# Patient Record
Sex: MALE | Race: WHITE | NOT HISPANIC OR LATINO | Employment: FULL TIME | URBAN - METROPOLITAN AREA
[De-identification: names, ages, dates, MRNs, and addresses within clinical notes are randomized per-mention and may not be internally consistent; named-entity substitution may affect disease eponyms.]

---

## 2023-05-22 LAB
APPEARANCE, URINE: CLEAR
BACTERIA, URINE: ABNORMAL /HPF
BILIRUBIN, URINE: NEGATIVE
BLOOD, URINE: ABNORMAL
COLOR, URINE: ABNORMAL
GLUCOSE, URINE: NEGATIVE MG/DL
KETONES, URINE: NEGATIVE MG/DL
LEUKOCYTE ESTERASE, URINE: NEGATIVE
NITRITE, URINE: NEGATIVE
PH, URINE: 6 (ref 5–8)
PROSTATE SPECIFIC AG (NG/ML) IN SER/PLAS: 1.28 NG/ML (ref 0–4)
PROTEIN, URINE: NEGATIVE MG/DL
RBC, URINE: 6 /HPF (ref 0–5)
SPECIFIC GRAVITY, URINE: 1 (ref 1–1.03)
SQUAMOUS EPITHELIAL CELLS, URINE: <1 /HPF
UROBILINOGEN, URINE: <2 MG/DL (ref 0–1.9)
WBC, URINE: ABNORMAL /HPF (ref 0–5)

## 2023-05-23 LAB — URINE CULTURE: ABNORMAL

## 2023-05-30 LAB
APPEARANCE, URINE: CLEAR
BACTERIA, URINE: ABNORMAL /HPF
BILIRUBIN, URINE: NEGATIVE
BLOOD, URINE: ABNORMAL
COLOR, URINE: ABNORMAL
GLUCOSE, URINE: NEGATIVE MG/DL
KETONES, URINE: NEGATIVE MG/DL
LEUKOCYTE ESTERASE, URINE: NEGATIVE
NITRITE, URINE: NEGATIVE
PH, URINE: 7 (ref 5–8)
PROTEIN, URINE: NEGATIVE MG/DL
RBC, URINE: 1 /HPF (ref 0–5)
SPECIFIC GRAVITY, URINE: 1 (ref 1–1.03)
SQUAMOUS EPITHELIAL CELLS, URINE: 1 /HPF
UROBILINOGEN, URINE: <2 MG/DL (ref 0–1.9)
WBC, URINE: <1 /HPF (ref 0–5)

## 2023-05-31 LAB — URINE CULTURE: ABNORMAL

## 2023-06-26 LAB
ALANINE AMINOTRANSFERASE (SGPT) (U/L) IN SER/PLAS: 19 U/L (ref 10–52)
ALBUMIN (G/DL) IN SER/PLAS: 4.1 G/DL (ref 3.4–5)
ALKALINE PHOSPHATASE (U/L) IN SER/PLAS: 55 U/L (ref 33–136)
ANION GAP IN SER/PLAS: 12 MMOL/L (ref 10–20)
ASPARTATE AMINOTRANSFERASE (SGOT) (U/L) IN SER/PLAS: 17 U/L (ref 9–39)
BASOPHILS (10*3/UL) IN BLOOD BY AUTOMATED COUNT: 0.07 X10E9/L (ref 0–0.1)
BASOPHILS/100 LEUKOCYTES IN BLOOD BY AUTOMATED COUNT: 0.8 % (ref 0–2)
BILIRUBIN TOTAL (MG/DL) IN SER/PLAS: 0.8 MG/DL (ref 0–1.2)
CALCIUM (MG/DL) IN SER/PLAS: 9.4 MG/DL (ref 8.6–10.6)
CARBON DIOXIDE, TOTAL (MMOL/L) IN SER/PLAS: 29 MMOL/L (ref 21–32)
CHLORIDE (MMOL/L) IN SER/PLAS: 100 MMOL/L (ref 98–107)
CHOLESTEROL (MG/DL) IN SER/PLAS: 137 MG/DL (ref 0–199)
CHOLESTEROL IN HDL (MG/DL) IN SER/PLAS: 63.3 MG/DL
CHOLESTEROL/HDL RATIO: 2.2
CREATININE (MG/DL) IN SER/PLAS: 0.94 MG/DL (ref 0.5–1.3)
EOSINOPHILS (10*3/UL) IN BLOOD BY AUTOMATED COUNT: 0.17 X10E9/L (ref 0–0.7)
EOSINOPHILS/100 LEUKOCYTES IN BLOOD BY AUTOMATED COUNT: 1.8 % (ref 0–6)
ERYTHROCYTE DISTRIBUTION WIDTH (RATIO) BY AUTOMATED COUNT: 13.7 % (ref 11.5–14.5)
ERYTHROCYTE MEAN CORPUSCULAR HEMOGLOBIN CONCENTRATION (G/DL) BY AUTOMATED: 33.4 G/DL (ref 32–36)
ERYTHROCYTE MEAN CORPUSCULAR VOLUME (FL) BY AUTOMATED COUNT: 100 FL (ref 80–100)
ERYTHROCYTES (10*6/UL) IN BLOOD BY AUTOMATED COUNT: 4.66 X10E12/L (ref 4.5–5.9)
ESTIMATED AVERAGE GLUCOSE FOR HBA1C: 111 MG/DL
GFR MALE: 88 ML/MIN/1.73M2
GLUCOSE (MG/DL) IN SER/PLAS: 95 MG/DL (ref 74–99)
HEMATOCRIT (%) IN BLOOD BY AUTOMATED COUNT: 46.4 % (ref 41–52)
HEMOGLOBIN (G/DL) IN BLOOD: 15.5 G/DL (ref 13.5–17.5)
HEMOGLOBIN A1C/HEMOGLOBIN TOTAL IN BLOOD: 5.5 %
IMMATURE GRANULOCYTES/100 LEUKOCYTES IN BLOOD BY AUTOMATED COUNT: 3.4 % (ref 0–0.9)
LDL: 58 MG/DL (ref 0–99)
LEUKOCYTES (10*3/UL) IN BLOOD BY AUTOMATED COUNT: 9.3 X10E9/L (ref 4.4–11.3)
LYMPHOCYTES (10*3/UL) IN BLOOD BY AUTOMATED COUNT: 1.76 X10E9/L (ref 1.2–4.8)
LYMPHOCYTES/100 LEUKOCYTES IN BLOOD BY AUTOMATED COUNT: 18.9 % (ref 13–44)
MONOCYTES (10*3/UL) IN BLOOD BY AUTOMATED COUNT: 1.09 X10E9/L (ref 0.1–1)
MONOCYTES/100 LEUKOCYTES IN BLOOD BY AUTOMATED COUNT: 11.7 % (ref 2–10)
NEUTROPHILS (10*3/UL) IN BLOOD BY AUTOMATED COUNT: 5.89 X10E9/L (ref 1.2–7.7)
NEUTROPHILS/100 LEUKOCYTES IN BLOOD BY AUTOMATED COUNT: 63.4 % (ref 40–80)
NRBC (PER 100 WBCS) BY AUTOMATED COUNT: 0 /100 WBC (ref 0–0)
PLATELETS (10*3/UL) IN BLOOD AUTOMATED COUNT: 270 X10E9/L (ref 150–450)
POTASSIUM (MMOL/L) IN SER/PLAS: 3.8 MMOL/L (ref 3.5–5.3)
PROTEIN TOTAL: 6.8 G/DL (ref 6.4–8.2)
SODIUM (MMOL/L) IN SER/PLAS: 137 MMOL/L (ref 136–145)
TRIGLYCERIDE (MG/DL) IN SER/PLAS: 79 MG/DL (ref 0–149)
URATE (MG/DL) IN SER/PLAS: 7.3 MG/DL (ref 4–7.5)
UREA NITROGEN (MG/DL) IN SER/PLAS: 21 MG/DL (ref 6–23)
VLDL: 16 MG/DL (ref 0–40)

## 2023-08-22 ENCOUNTER — PATIENT OUTREACH (OUTPATIENT)
Dept: CARE COORDINATION | Facility: CLINIC | Age: 68
End: 2023-08-22
Payer: MEDICARE

## 2023-08-22 NOTE — PROGRESS NOTES
Patient was outreached by floor nurse for ANURADHA. Patient presented with CVA symptoms after having MRI for hearing complaint. Patient will follow up with PCP tomorrow and will get order for  as output.  Patient has Medicare.     Task set for follow up after PCP     JANIS.

## 2023-09-01 LAB — TESTOSTERONE (NG/DL) IN SER/PLAS: 330 NG/DL (ref 240–1000)

## 2023-09-13 PROBLEM — N28.1 CYST, KIDNEY, ACQUIRED: Status: ACTIVE | Noted: 2023-09-13

## 2023-09-13 PROBLEM — M43.10 SPONDYLOLISTHESIS, GRADE 2: Status: ACTIVE | Noted: 2023-09-13

## 2023-09-13 PROBLEM — J45.909: Status: ACTIVE | Noted: 2023-09-13

## 2023-09-13 PROBLEM — J30.9 ALLERGIC RHINITIS: Status: ACTIVE | Noted: 2023-09-13

## 2023-09-13 PROBLEM — L29.9 PRURITIC DISORDER: Status: ACTIVE | Noted: 2023-09-13

## 2023-09-13 PROBLEM — L57.0 ACTINIC KERATOSES: Status: ACTIVE | Noted: 2023-09-13

## 2023-09-13 PROBLEM — M19.019 PRIMARY LOCALIZED OSTEOARTHROSIS OF SHOULDER REGION: Status: ACTIVE | Noted: 2023-09-13

## 2023-09-13 PROBLEM — M25.562 LEFT KNEE PAIN: Status: ACTIVE | Noted: 2023-09-13

## 2023-09-13 PROBLEM — R05.3 PERSISTENT COUGH: Status: ACTIVE | Noted: 2023-09-13

## 2023-09-13 PROBLEM — N52.9 MALE ERECTILE DISORDER OF ORGANIC ORIGIN: Status: ACTIVE | Noted: 2023-09-13

## 2023-09-13 PROBLEM — G47.33 OBSTRUCTIVE SLEEP APNEA: Status: ACTIVE | Noted: 2023-09-13

## 2023-09-13 PROBLEM — R94.31 ABNORMAL EKG: Status: ACTIVE | Noted: 2023-09-13

## 2023-09-13 PROBLEM — R73.01 IMPAIRED FASTING GLUCOSE: Status: ACTIVE | Noted: 2023-09-13

## 2023-09-13 PROBLEM — S82.034A CLOSED NONDISPLACED TRANSVERSE FRACTURE OF RIGHT PATELLA: Status: ACTIVE | Noted: 2023-09-13

## 2023-09-13 PROBLEM — D72.9 WHITE BLOOD CELL ABNORMALITY: Status: ACTIVE | Noted: 2023-09-13

## 2023-09-13 PROBLEM — M54.30 SCIATICA: Status: ACTIVE | Noted: 2023-09-13

## 2023-09-13 PROBLEM — N40.1 BENIGN PROSTATIC HYPERPLASIA WITH LOWER URINARY TRACT SYMPTOMS: Status: ACTIVE | Noted: 2023-09-13

## 2023-09-13 PROBLEM — M11.20 PSEUDOGOUT: Status: ACTIVE | Noted: 2023-09-13

## 2023-09-13 PROBLEM — K63.5 COLON POLYP: Status: ACTIVE | Noted: 2023-09-13

## 2023-09-13 PROBLEM — N18.30 CHRONIC KIDNEY DISEASE, STAGE III (MODERATE) (MULTI): Status: ACTIVE | Noted: 2023-09-13

## 2023-09-13 PROBLEM — M17.11 PRIMARY OSTEOARTHRITIS OF RIGHT KNEE: Status: ACTIVE | Noted: 2023-09-13

## 2023-09-13 PROBLEM — K76.0 FATTY (CHANGE OF) LIVER, NOT ELSEWHERE CLASSIFIED: Status: ACTIVE | Noted: 2023-09-13

## 2023-09-13 PROBLEM — K57.30 DIVERTICULOSIS OF LARGE INTESTINE WITHOUT HEMORRHAGE: Status: ACTIVE | Noted: 2023-09-13

## 2023-09-13 PROBLEM — E78.5 HYPERLIPIDEMIA: Status: ACTIVE | Noted: 2023-09-13

## 2023-09-13 PROBLEM — E88.810 DYSMETABOLIC SYNDROME X: Status: ACTIVE | Noted: 2023-09-13

## 2023-09-13 PROBLEM — I10 HYPERTENSION: Status: ACTIVE | Noted: 2023-09-13

## 2023-09-13 PROBLEM — M75.102 TEAR OF LEFT ROTATOR CUFF: Status: ACTIVE | Noted: 2023-09-13

## 2023-09-13 PROBLEM — N40.0 ENLARGED PROSTATE WITHOUT LOWER URINARY TRACT SYMPTOMS (LUTS): Status: ACTIVE | Noted: 2023-09-13

## 2023-09-13 PROBLEM — E66.9 OBESITY: Status: ACTIVE | Noted: 2023-09-13

## 2023-09-13 PROBLEM — M54.16 BILATERAL LUMBAR RADICULOPATHY: Status: ACTIVE | Noted: 2023-09-13

## 2023-09-13 PROBLEM — K59.01 SLOW TRANSIT CONSTIPATION: Status: ACTIVE | Noted: 2023-09-13

## 2023-09-13 PROBLEM — R31.21 ASYMPTOMATIC MICROSCOPIC HEMATURIA: Status: ACTIVE | Noted: 2023-09-13

## 2023-09-13 PROBLEM — E29.1 HYPOGONADISM MALE: Status: ACTIVE | Noted: 2023-09-13

## 2023-09-13 PROBLEM — G56.01 RIGHT CARPAL TUNNEL SYNDROME: Status: ACTIVE | Noted: 2023-09-13

## 2023-09-13 PROBLEM — E55.9 VITAMIN D DEFICIENCY: Status: ACTIVE | Noted: 2023-09-13

## 2023-09-13 PROBLEM — M17.12 PRIMARY OSTEOARTHRITIS OF LEFT KNEE: Status: ACTIVE | Noted: 2023-09-13

## 2023-09-13 PROBLEM — T14.8XXA SPRAIN: Status: ACTIVE | Noted: 2023-09-13

## 2023-09-13 PROBLEM — M54.50 LOW BACK PAIN: Status: ACTIVE | Noted: 2023-09-13

## 2023-09-13 PROBLEM — M19.012 OSTEOARTHRITIS OF LEFT SHOULDER: Status: ACTIVE | Noted: 2023-09-13

## 2023-09-13 PROBLEM — M10.9 GOUT, ARTHRITIS: Status: ACTIVE | Noted: 2023-09-13

## 2023-09-13 RX ORDER — METFORMIN HYDROCHLORIDE 500 MG/1
2 TABLET, EXTENDED RELEASE ORAL EVERY MORNING
COMMUNITY
Start: 2018-04-13 | End: 2024-02-09 | Stop reason: SDUPTHER

## 2023-09-13 RX ORDER — AMLODIPINE BESYLATE 5 MG/1
5 TABLET ORAL DAILY
COMMUNITY
End: 2023-11-20 | Stop reason: SDUPTHER

## 2023-09-13 RX ORDER — ALBUTEROL SULFATE 90 UG/1
1-2 AEROSOL, METERED RESPIRATORY (INHALATION)
COMMUNITY
Start: 2016-11-03

## 2023-09-13 RX ORDER — CYCLOBENZAPRINE HCL 10 MG
10 TABLET ORAL EVERY 12 HOURS PRN
COMMUNITY
Start: 2023-07-05

## 2023-09-13 RX ORDER — ONDANSETRON 4 MG/1
1 TABLET, FILM COATED ORAL 3 TIMES DAILY PRN
COMMUNITY
Start: 2019-11-18

## 2023-09-13 RX ORDER — TESTOSTERONE CYPIONATE 200 MG/ML
200 INJECTION, SOLUTION INTRAMUSCULAR
COMMUNITY
End: 2023-11-14

## 2023-09-13 RX ORDER — FLUTICASONE FUROATE 27.5 UG/1
1 SPRAY, METERED NASAL
COMMUNITY

## 2023-09-13 RX ORDER — OLMESARTAN MEDOXOMIL 20 MG/1
1 TABLET ORAL DAILY
COMMUNITY
Start: 2020-06-12 | End: 2023-11-18

## 2023-09-13 RX ORDER — FLUTICASONE PROPIONATE AND SALMETEROL 500; 50 UG/1; UG/1
1 POWDER RESPIRATORY (INHALATION) 2 TIMES DAILY
COMMUNITY

## 2023-09-13 RX ORDER — SILDENAFIL 100 MG/1
100 TABLET, FILM COATED ORAL DAILY
COMMUNITY
Start: 2020-07-29

## 2023-09-13 RX ORDER — SILDENAFIL CITRATE 20 MG/1
20 TABLET ORAL DAILY
COMMUNITY

## 2023-09-13 RX ORDER — POLYETHYLENE GLYCOL 3350 17 G/17G
17 POWDER, FOR SOLUTION ORAL DAILY PRN
COMMUNITY

## 2023-09-13 RX ORDER — TIZANIDINE 4 MG/1
4 TABLET ORAL AS NEEDED
COMMUNITY

## 2023-09-13 RX ORDER — OLMESARTAN MEDOXOMIL AND HYDROCHLOROTHIAZIDE 20/12.5 20; 12.5 MG/1; MG/1
1 TABLET ORAL DAILY
COMMUNITY
End: 2024-02-09 | Stop reason: SDUPTHER

## 2023-09-13 RX ORDER — TRIAMCINOLONE ACETONIDE 1 MG/G
CREAM TOPICAL 2 TIMES DAILY
COMMUNITY
Start: 2020-07-27

## 2023-09-13 RX ORDER — OMEPRAZOLE 20 MG/1
1 TABLET, DELAYED RELEASE ORAL DAILY
COMMUNITY
Start: 2019-11-18

## 2023-09-13 RX ORDER — RIVAROXABAN 10 MG/1
10 TABLET, FILM COATED ORAL DAILY
COMMUNITY
Start: 2023-07-03 | End: 2024-06-03 | Stop reason: WASHOUT

## 2023-09-13 RX ORDER — LEVOCETIRIZINE DIHYDROCHLORIDE 5 MG/1
5 TABLET, FILM COATED ORAL EVERY OTHER DAY
COMMUNITY

## 2023-09-13 RX ORDER — CLOPIDOGREL BISULFATE 75 MG/1
TABLET ORAL
COMMUNITY
Start: 2023-08-21 | End: 2024-06-03 | Stop reason: WASHOUT

## 2023-09-13 RX ORDER — FLUTICASONE PROPIONATE AND SALMETEROL 100; 50 UG/1; UG/1
1 POWDER RESPIRATORY (INHALATION)
COMMUNITY

## 2023-09-13 RX ORDER — COLCHICINE 0.6 MG/1
1.2 TABLET ORAL
COMMUNITY

## 2023-09-13 RX ORDER — ATORVASTATIN CALCIUM 10 MG/1
1 TABLET, FILM COATED ORAL DAILY
COMMUNITY
Start: 2012-05-18 | End: 2024-06-03 | Stop reason: WASHOUT

## 2023-09-13 RX ORDER — ASPIRIN 81 MG/1
81 TABLET ORAL DAILY
COMMUNITY

## 2023-09-13 RX ORDER — GABAPENTIN 300 MG/1
300 CAPSULE ORAL AS NEEDED
COMMUNITY
Start: 2023-07-03

## 2023-09-13 RX ORDER — DOCUSATE SODIUM 100 MG/1
100 CAPSULE, LIQUID FILLED ORAL 2 TIMES DAILY
COMMUNITY
Start: 2019-11-18

## 2023-09-15 ENCOUNTER — PATIENT OUTREACH (OUTPATIENT)
Dept: CARE COORDINATION | Facility: CLINIC | Age: 68
End: 2023-09-15
Payer: MEDICARE

## 2023-10-26 ENCOUNTER — CLINICAL SUPPORT (OUTPATIENT)
Dept: AUDIOLOGY | Facility: CLINIC | Age: 68
End: 2023-10-26
Payer: MEDICARE

## 2023-10-26 DIAGNOSIS — H90.A22 SENSORINEURAL HEARING LOSS (SNHL) OF LEFT EAR WITH RESTRICTED HEARING OF RIGHT EAR: Primary | ICD-10-CM

## 2023-10-30 NOTE — PROGRESS NOTES
HEARING AID CHECK    Name:  Michael Tam  :  1955  Age:  68 y.o.    HEARING AID INFORMATION:    Left Hearing Aid  Oticon Real 1 miniRITE-R  SN: [id number]  Warranty: 2026    Smart   SN: [id number]   Length  Left: 3(85)  Dome/Earmold  Left: 8mm power  Wax Filter  ProWax miniFit  Battery Size  Rechargeable  TLC Expiration:  2027    HISTORY:    Patient was seen for recheck of the above device.  He reported he continues to do okay with his hearing aid.  He is still getting used to it, but it does help give him sound awareness on his left side.  He still has difficulty hearing his wife if she is talking on that side of him.  Patient will be leaving for [place] next week, which he stated he does not want to bring the hearing aid so he does not lose it.    EXAM/PROCEDURES:    Informed patient that the office CROS demo is back from being on loan if he would like to demo it. He declined at this time but said he might consider it at a future appointment.    Visual inspection of the device revealed some cerumen accumulation.  Patient denied having changed the wax guard or dome, but does wipe it off periodically with an alcohol pad.  Replaced the domes and wax guard.  Reviewed and practiced with patient how to change these in the event he needs to do so at home prior to his next follow-up.    Connected hearing aid to fitting software.  Confirmed no firmware update was needed.  Auto acclimatization is still active.  Deferred real ear verification until follow-up appointment.    Recommended 3 follow-up. Patient reported he will be coming back from [place] sometime around March for his wife's doctors appointments, but they are not scheduled yet. Once he knows when those appointments will be, he will call the office to schedule his follow-up with Dr. Barcenas, recheck of his hearing, and next hearing aid check.    RECOMMENDATIONS AND FOLLOW-UP:    - Continue use of amplification and  follow-up as recommended for hearing aid maintenance and adjustments.  - Recommended 3-month follow-up HAC. Patient to contact the office sooner if problems arise.  - Monitor and recheck hearing as warranted.  - Counseled regarding results and recommendations.      Annika Roth  Clinical Audiologist

## 2023-11-07 RX ORDER — AMLODIPINE BESYLATE 5 MG/1
5 TABLET ORAL DAILY
Qty: 90 TABLET | OUTPATIENT
Start: 2023-11-07

## 2023-11-14 DIAGNOSIS — E29.1 TESTICULAR HYPOFUNCTION: ICD-10-CM

## 2023-11-14 RX ORDER — TESTOSTERONE CYPIONATE 200 MG/ML
INJECTION, SOLUTION INTRAMUSCULAR
Qty: 6 ML | Refills: 3 | Status: SHIPPED | OUTPATIENT
Start: 2023-11-14

## 2023-11-17 DIAGNOSIS — I10 HYPERTENSION, UNSPECIFIED TYPE: Primary | ICD-10-CM

## 2023-11-18 RX ORDER — OLMESARTAN MEDOXOMIL 20 MG/1
20 TABLET ORAL DAILY
Qty: 90 TABLET | Refills: 0 | Status: SHIPPED | OUTPATIENT
Start: 2023-11-18

## 2023-11-20 DIAGNOSIS — I10 HYPERTENSION, UNSPECIFIED TYPE: Primary | ICD-10-CM

## 2023-11-20 RX ORDER — AMLODIPINE BESYLATE 5 MG/1
5 TABLET ORAL DAILY
Qty: 90 TABLET | Refills: 3 | Status: SHIPPED | OUTPATIENT
Start: 2023-11-20

## 2023-11-28 ENCOUNTER — OFFICE VISIT (OUTPATIENT)
Dept: UROLOGY | Facility: CLINIC | Age: 68
End: 2023-11-28
Payer: MEDICARE

## 2023-11-28 VITALS — WEIGHT: 222 LBS | HEIGHT: 69 IN | BODY MASS INDEX: 32.88 KG/M2

## 2023-11-28 DIAGNOSIS — N40.1 BENIGN PROSTATIC HYPERPLASIA WITH LOWER URINARY TRACT SYMPTOMS, SYMPTOM DETAILS UNSPECIFIED: Primary | ICD-10-CM

## 2023-11-28 PROCEDURE — 99213 OFFICE O/P EST LOW 20 MIN: CPT | Performed by: NURSE PRACTITIONER

## 2023-11-28 PROCEDURE — 51798 US URINE CAPACITY MEASURE: CPT | Performed by: NURSE PRACTITIONER

## 2023-11-28 PROCEDURE — 1126F AMNT PAIN NOTED NONE PRSNT: CPT | Performed by: NURSE PRACTITIONER

## 2023-11-28 PROCEDURE — 1036F TOBACCO NON-USER: CPT | Performed by: NURSE PRACTITIONER

## 2023-11-28 PROCEDURE — 3008F BODY MASS INDEX DOCD: CPT | Performed by: NURSE PRACTITIONER

## 2023-11-28 ASSESSMENT — PAIN SCALES - GENERAL: PAINLEVEL: 0-NO PAIN

## 2023-11-28 NOTE — PROGRESS NOTES
[facility]  Outpatient Clinic Note      Patient: Michael Tam  Age/Sex: 68 y.o., male  MRN: [id number]      History of Present Illness  68-year-old gentleman presents for follow-up. He previously followed with Renée Martinez CNP. He has a history of hypogonadism on testosterone replacement managed by Dr. Cortez, BPH and history of microscopic hematuria. He has no complaints today. He denies any dysuria, gross hematuria, flank pain, fevers or chills.     Past Medical & Surgical History  Past Medical History:   Diagnosis Date    Allergy status to unspecified drugs, medicaments and biological substances     History of allergy    Other chronic allergic conjunctivitis     Chronic allergic conjunctivitis    Other specified abnormal findings of blood chemistry 01/25/2016    Abnormal liver function test    Personal history of other diseases of the nervous system and sense organs     History of sleep apnea    Personal history of other diseases of the respiratory system     History of upper respiratory infection    Personal history of other diseases of the respiratory system     History of allergic rhinitis    Personal history of other diseases of the respiratory system     Personal history of asthma    Personal history of other specified conditions     History of headache    Postnasal drip     Postnasal drip      Past Surgical History:   Procedure Laterality Date    MR HEAD ANGIO WO IV CONTRAST  8/21/2023    MR HEAD ANGIO WO IV CONTRAST 8/21/2023 [facility] MRI    MR NECK ANGIO WO IV CONTRAST  8/21/2023    MR NECK ANGIO WO IV CONTRAST 8/21/2023 [facility] MRI    OTHER SURGICAL HISTORY  06/05/2013    Asc Colon Proc Manual Abd Explor Noting Gallstones    OTHER SURGICAL HISTORY  10/11/2013    Simple Excision Of Nasal Polyp          Labs  NA    Medications:  Current Outpatient Medications on File Prior to Visit   Medication Sig Dispense Refill    albuterol (ProAir HFA) 90 mcg/actuation inhaler Inhale 1-2 puffs. EVERY 4 TO 6 HOURS AS  NEEDED.      amLODIPine (Norvasc) 5 mg tablet Take 1 tablet (5 mg) by mouth once daily. as directed 90 tablet 3    aspirin (Vince Low Dose Aspirin) 81 mg EC tablet Take 1 tablet (81 mg) by mouth once daily.      atorvastatin (Lipitor) 10 mg tablet Take 1 tablet (10 mg) by mouth once daily.      clopidogrel (Plavix) 75 mg tablet       colchicine, gout, 0.6 mg tablet Take 2 tablets (1.2 mg) by mouth. NOW AT ONSET OF GOUT, REPEAT 1 TABLET 6 HOURS LATER THEN 1 TABLET DAILY      cyclobenzaprine (Flexeril) 10 mg tablet Take 1 tablet (10 mg) by mouth every 12 hours if needed.      docusate sodium (Colace) 100 mg capsule Take 1 capsule (100 mg) by mouth 2 times a day.      fluticasone (Flonase Sensimist) 27.5 mcg/actuation nasal spray Administer 1 spray into each nostril once daily.      fluticasone propion-salmeteroL (Advair Diskus) 100-50 mcg/dose diskus inhaler Inhale 1 puff 2 times a day.      gabapentin (Neurontin) 300 mg capsule Take 1 capsule (300 mg) by mouth if needed.      levocetirizine (Xyzal) 5 mg tablet Take 1 tablet (5 mg) by mouth every other day.      metFORMIN  mg 24 hr tablet Take 2 tablets (1,000 mg) by mouth once daily in the morning.      olmesartan (BENIcar) 20 mg tablet TAKE 1 TABLET BY MOUTH DAILY 90 tablet 0    olmesartan-hydrochlorothiazide (BENIcar HCT) 20-12.5 mg tablet Take 1 tablet by mouth once daily.      omeprazole OTC (PriLOSEC OTC) 20 mg EC tablet Take 1 tablet (20 mg) by mouth once daily.      ondansetron (Zofran) 4 mg tablet 1 tablet (4 mg) 3 times a day as needed for nausea.      polyethylene glycol (Miralax) 17 gram packet Take 17 g by mouth once daily as needed.      sildenafil (Revatio) 20 mg tablet Take 1 tablet (20 mg) by mouth once daily.      sildenafil (Viagra) 100 mg tablet Take 1 tablet (100 mg) by mouth once daily. 1 HOUR BEFORE NEEDED      testosterone cypionate (Depo-Testosterone) 200 mg/mL injection INJECT 1 ML EVERY 2 WEEKS 6 mL 3    tiZANidine (Zanaflex) 4 mg  tablet Take 1 tablet (4 mg) by mouth if needed.      triamcinolone (Kenalog) 0.1 % cream Apply topically twice a day.      Wixela Inhub 500-50 mcg/dose diskus inhaler Inhale 1 puff 2 times a day.      Xarelto 10 mg tablet Take 1 tablet (10 mg) by mouth once daily.       No current facility-administered medications on file prior to visit.          Physical Exam                                                                                                                      General: Well developed, well nourished, alert and cooperative, appears in no acute distress  Eyes: Non-injected conjunctiva, sclera clear, no proptosis  Cardiac: Extremities are warm and well perfused. No edema, cyanosis or pallor.   Lungs: Breathing is easy, non-labored. Speaking in clear and complete sentences. Normal diaphragmatic movement.  MSK: Ambulatory with steady gait, unassisted  Neuro: alert and oriented to person, place and time  Psych: Demonstrates good judgement and reason, without hallucinations, abnormal affect or abnormal behaviors.  Skin: no obvious lesions, no rashes      Review of Systems  Review of Systems   All other systems reviewed and are negative.         Imaging  NA      Assessment & Plan  68-year-old gentleman presents for follow-up. He previously followed with Renée Martinez CNP. He has a history of hypogonadism on testosterone replacement managed by Dr. Cortez, BPH and history of microscopic hematuria. He has no complaints today. He denies any dysuria, gross hematuria, flank pain, fevers or chills.     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     Follow-up 6 months, or sooner if needed      Reviewed and approved by DES DICKERSON on 11/28/23 at 9:03 AM.

## 2024-01-18 ENCOUNTER — TELEPHONE (OUTPATIENT)
Dept: OTOLARYNGOLOGY | Facility: CLINIC | Age: 69
End: 2024-01-18
Payer: MEDICARE

## 2024-01-29 ENCOUNTER — APPOINTMENT (OUTPATIENT)
Dept: NEUROLOGY | Facility: CLINIC | Age: 69
End: 2024-01-29
Payer: MEDICARE

## 2024-02-09 DIAGNOSIS — R73.01 IMPAIRED FASTING GLUCOSE: ICD-10-CM

## 2024-02-09 DIAGNOSIS — I10 HYPERTENSION, UNSPECIFIED TYPE: ICD-10-CM

## 2024-02-09 RX ORDER — OLMESARTAN MEDOXOMIL AND HYDROCHLOROTHIAZIDE 20/12.5 20; 12.5 MG/1; MG/1
1 TABLET ORAL DAILY
Qty: 90 TABLET | Refills: 3 | Status: SHIPPED | OUTPATIENT
Start: 2024-02-09 | End: 2024-06-03 | Stop reason: WASHOUT

## 2024-02-09 RX ORDER — METFORMIN HYDROCHLORIDE 500 MG/1
1000 TABLET, EXTENDED RELEASE ORAL EVERY MORNING
Qty: 180 TABLET | Refills: 3 | Status: SHIPPED | OUTPATIENT
Start: 2024-02-09 | End: 2024-02-09 | Stop reason: SDUPTHER

## 2024-02-09 RX ORDER — METFORMIN HYDROCHLORIDE 500 MG/1
1000 TABLET, EXTENDED RELEASE ORAL EVERY MORNING
Qty: 180 TABLET | Refills: 3 | Status: SHIPPED | OUTPATIENT
Start: 2024-02-09

## 2024-05-24 PROCEDURE — 88305 TISSUE EXAM BY PATHOLOGIST: CPT | Performed by: DERMATOLOGY

## 2024-05-28 ENCOUNTER — LAB REQUISITION (OUTPATIENT)
Dept: DERMATOPATHOLOGY | Facility: CLINIC | Age: 69
End: 2024-05-28
Payer: MEDICARE

## 2024-05-28 DIAGNOSIS — L98.8 OTHER SPECIFIED DISORDERS OF THE SKIN AND SUBCUTANEOUS TISSUE: ICD-10-CM

## 2024-05-29 LAB
LABORATORY COMMENT REPORT: NORMAL
PATH REPORT.FINAL DX SPEC: NORMAL
PATH REPORT.GROSS SPEC: NORMAL
PATH REPORT.MICROSCOPIC SPEC OTHER STN: NORMAL
PATH REPORT.RELEVANT HX SPEC: NORMAL
PATH REPORT.TOTAL CANCER: NORMAL

## 2024-06-03 ENCOUNTER — LAB (OUTPATIENT)
Dept: LAB | Facility: LAB | Age: 69
End: 2024-06-03
Payer: MEDICARE

## 2024-06-03 ENCOUNTER — OFFICE VISIT (OUTPATIENT)
Dept: PRIMARY CARE | Facility: CLINIC | Age: 69
End: 2024-06-03
Payer: MEDICARE

## 2024-06-03 VITALS
BODY MASS INDEX: 33.97 KG/M2 | SYSTOLIC BLOOD PRESSURE: 112 MMHG | DIASTOLIC BLOOD PRESSURE: 70 MMHG | OXYGEN SATURATION: 93 % | HEART RATE: 73 BPM | WEIGHT: 230 LBS

## 2024-06-03 DIAGNOSIS — E78.49 OTHER HYPERLIPIDEMIA: ICD-10-CM

## 2024-06-03 DIAGNOSIS — Z12.5 PROSTATE CANCER SCREENING: ICD-10-CM

## 2024-06-03 DIAGNOSIS — R73.01 IFG (IMPAIRED FASTING GLUCOSE): ICD-10-CM

## 2024-06-03 DIAGNOSIS — I10 PRIMARY HYPERTENSION: ICD-10-CM

## 2024-06-03 DIAGNOSIS — N18.31 STAGE 3A CHRONIC KIDNEY DISEASE (MULTI): ICD-10-CM

## 2024-06-03 DIAGNOSIS — Z12.11 COLON CANCER SCREENING: Primary | ICD-10-CM

## 2024-06-03 LAB
ALBUMIN SERPL BCP-MCNC: 4.5 G/DL (ref 3.4–5)
ALP SERPL-CCNC: 60 U/L (ref 33–136)
ALT SERPL W P-5'-P-CCNC: 29 U/L (ref 10–52)
ANION GAP SERPL CALC-SCNC: 14 MMOL/L (ref 10–20)
AST SERPL W P-5'-P-CCNC: 28 U/L (ref 9–39)
BILIRUB SERPL-MCNC: 1.3 MG/DL (ref 0–1.2)
BUN SERPL-MCNC: 25 MG/DL (ref 6–23)
CALCIUM SERPL-MCNC: 9.9 MG/DL (ref 8.6–10.6)
CHLORIDE SERPL-SCNC: 98 MMOL/L (ref 98–107)
CHOLEST SERPL-MCNC: 137 MG/DL (ref 0–199)
CHOLESTEROL/HDL RATIO: 2
CO2 SERPL-SCNC: 29 MMOL/L (ref 21–32)
CREAT SERPL-MCNC: 1.13 MG/DL (ref 0.5–1.3)
CREAT UR-MCNC: 41 MG/DL (ref 20–370)
EGFRCR SERPLBLD CKD-EPI 2021: 70 ML/MIN/1.73M*2
ERYTHROCYTE [DISTWIDTH] IN BLOOD BY AUTOMATED COUNT: 14.6 % (ref 11.5–14.5)
EST. AVERAGE GLUCOSE BLD GHB EST-MCNC: 105 MG/DL
GLUCOSE SERPL-MCNC: 96 MG/DL (ref 74–99)
HBA1C MFR BLD: 5.3 %
HCT VFR BLD AUTO: 47.8 % (ref 41–52)
HDLC SERPL-MCNC: 67.2 MG/DL
HGB BLD-MCNC: 15.4 G/DL (ref 13.5–17.5)
LDLC SERPL CALC-MCNC: 59 MG/DL
MCH RBC QN AUTO: 31.6 PG (ref 26–34)
MCHC RBC AUTO-ENTMCNC: 32.2 G/DL (ref 32–36)
MCV RBC AUTO: 98 FL (ref 80–100)
MICROALBUMIN UR-MCNC: <7 MG/L
MICROALBUMIN/CREAT UR: NORMAL MG/G{CREAT}
NON HDL CHOLESTEROL: 70 MG/DL (ref 0–149)
NRBC BLD-RTO: 0 /100 WBCS (ref 0–0)
PLATELET # BLD AUTO: 266 X10*3/UL (ref 150–450)
POTASSIUM SERPL-SCNC: 4.4 MMOL/L (ref 3.5–5.3)
PROT SERPL-MCNC: 7 G/DL (ref 6.4–8.2)
PSA SERPL-MCNC: 1.35 NG/ML
RBC # BLD AUTO: 4.88 X10*6/UL (ref 4.5–5.9)
SODIUM SERPL-SCNC: 137 MMOL/L (ref 136–145)
TRIGL SERPL-MCNC: 55 MG/DL (ref 0–149)
VLDL: 11 MG/DL (ref 0–40)
WBC # BLD AUTO: 11.6 X10*3/UL (ref 4.4–11.3)

## 2024-06-03 PROCEDURE — 99214 OFFICE O/P EST MOD 30 MIN: CPT | Performed by: INTERNAL MEDICINE

## 2024-06-03 PROCEDURE — 85027 COMPLETE CBC AUTOMATED: CPT

## 2024-06-03 PROCEDURE — G0439 PPPS, SUBSEQ VISIT: HCPCS | Performed by: INTERNAL MEDICINE

## 2024-06-03 PROCEDURE — 1159F MED LIST DOCD IN RCRD: CPT | Performed by: INTERNAL MEDICINE

## 2024-06-03 PROCEDURE — 3008F BODY MASS INDEX DOCD: CPT | Performed by: INTERNAL MEDICINE

## 2024-06-03 PROCEDURE — 82043 UR ALBUMIN QUANTITATIVE: CPT

## 2024-06-03 PROCEDURE — 36415 COLL VENOUS BLD VENIPUNCTURE: CPT

## 2024-06-03 PROCEDURE — 1036F TOBACCO NON-USER: CPT | Performed by: INTERNAL MEDICINE

## 2024-06-03 PROCEDURE — 3078F DIAST BP <80 MM HG: CPT | Performed by: INTERNAL MEDICINE

## 2024-06-03 PROCEDURE — 1160F RVW MEDS BY RX/DR IN RCRD: CPT | Performed by: INTERNAL MEDICINE

## 2024-06-03 PROCEDURE — 1170F FXNL STATUS ASSESSED: CPT | Performed by: INTERNAL MEDICINE

## 2024-06-03 PROCEDURE — 83036 HEMOGLOBIN GLYCOSYLATED A1C: CPT

## 2024-06-03 PROCEDURE — 80053 COMPREHEN METABOLIC PANEL: CPT

## 2024-06-03 PROCEDURE — 82570 ASSAY OF URINE CREATININE: CPT

## 2024-06-03 PROCEDURE — 80061 LIPID PANEL: CPT

## 2024-06-03 PROCEDURE — 3074F SYST BP LT 130 MM HG: CPT | Performed by: INTERNAL MEDICINE

## 2024-06-03 PROCEDURE — G0103 PSA SCREENING: HCPCS

## 2024-06-03 ASSESSMENT — ENCOUNTER SYMPTOMS
BACK PAIN: 1
OCCASIONAL FEELINGS OF UNSTEADINESS: 0
SHORTNESS OF BREATH: 0
ACTIVITY CHANGE: 0
WHEEZING: 0
SINUS PAIN: 0
SLEEP DISTURBANCE: 1
SORE THROAT: 0
LOSS OF SENSATION IN FEET: 0
DEPRESSION: 0
PALPITATIONS: 0
DYSPHORIC MOOD: 0
DIZZINESS: 0

## 2024-06-03 ASSESSMENT — PATIENT HEALTH QUESTIONNAIRE - PHQ9
1. LITTLE INTEREST OR PLEASURE IN DOING THINGS: NOT AT ALL
SUM OF ALL RESPONSES TO PHQ9 QUESTIONS 1 AND 2: 0
2. FEELING DOWN, DEPRESSED OR HOPELESS: NOT AT ALL

## 2024-06-03 ASSESSMENT — ACTIVITIES OF DAILY LIVING (ADL)
TAKING_MEDICATION: INDEPENDENT
DOING_HOUSEWORK: INDEPENDENT
DRESSING: INDEPENDENT
GROCERY_SHOPPING: INDEPENDENT
MANAGING_FINANCES: INDEPENDENT
BATHING: INDEPENDENT

## 2024-06-03 NOTE — PROGRESS NOTES
Subjective   Reason for Visit: Michael Tam is an 69 y.o. male here for a Medicare Wellness visit.     Past Medical, Surgical, and Family History reviewed and updated in chart.    Reviewed all medications by prescribing practitioner or clinical pharmacist (such as prescriptions, OTCs, herbal therapies and supplements) and documented in the medical record.    HPI    70 yo w m for annual viiisiiiiiiit    Exercise walks and gym 40 min daily    Patient Care Team:  Tomy Palma MD as PCP - General (Internal Medicine)  Tomy Palma [facility] PCP [facility]ributed Provider  Tomy Palma MD     Review of Systems   Constitutional:  Negative for activity change.   HENT:  Negative for sinus pain and sore throat.    Respiratory:  Negative for shortness of breath and wheezing.         On cpap   Cardiovascular:  Negative for chest pain, palpitations and leg swelling.   Gastrointestinal:         Negative for colon   Genitourinary: Negative.    Musculoskeletal:  Positive for back pain (sees son in law for PT).   Skin:         Left leg subcutaneous cell cancer will have Mohs   Neurological:  Negative for dizziness.   Psychiatric/Behavioral:  Positive for sleep disturbance. Negative for dysphoric mood.        Objective   Vitals:  BP 96/61 (BP Location: Right arm, Patient Position: Sitting)   Pulse 73   Wt 104 kg (230 lb)   SpO2 93%   BMI 33.97 kg/m²       Physical Exam  Constitutional:       Appearance: He is obese.   HENT:      Right Ear: Tympanic membrane normal.      Left Ear: Tympanic membrane normal.   Neck:      Vascular: No carotid bruit.   Cardiovascular:      Rate and Rhythm: Regular rhythm.      Heart sounds: Normal heart sounds. No murmur heard.  Pulmonary:      Breath sounds: Wheezing present.   Abdominal:      General: Abdomen is flat. Bowel sounds are normal.      Tenderness: There is no abdominal tenderness.      Hernia: No hernia is present.   Genitourinary:     Penis: Normal.        Testes: Normal.      Prostate: Normal.      Rectum: Normal. Guaiac result negative.   Musculoskeletal:      Cervical back: No rigidity or tenderness.      Right lower leg: No edema.      Left lower leg: No edema.   Lymphadenopathy:      Cervical: No cervical adenopathy.   Skin:     Coloration: Skin is not jaundiced.   Neurological:      Mental Status: He is alert. Mental status is at baseline.   Psychiatric:         Mood and Affect: Mood normal.         Assessment/Plan   Diagnoses and all orders for this visit:  Colon cancer screening  -     Colonoscopy Screening; Average Risk Patient; Future  IFG (impaired fasting glucose)  -     Albumin , Urine Random; Future  -     Hemoglobin A1C; Future  -     Lipid Panel; Future  -     CBC; Future  -     Comprehensive Metabolic Panel; Future  Prostate cancer screening  -     Prostate Specific Antigen; Future  Other hyperlipidemia  -     Lipid Panel; Future  Stage 3a chronic kidney disease (Multi)  Primary hypertension  Good control    Stroke  I can't find amb mon results  Pt will check with neuro [facility]cf and get back to me

## 2024-06-06 DIAGNOSIS — K63.5 POLYP OF COLON, UNSPECIFIED PART OF COLON, UNSPECIFIED TYPE: ICD-10-CM

## 2024-06-07 RX ORDER — POLYETHYLENE GLYCOL 3350, SODIUM CHLORIDE, SODIUM BICARBONATE, POTASSIUM CHLORIDE 420; 11.2; 5.72; 1.48 G/4L; G/4L; G/4L; G/4L
POWDER, FOR SOLUTION ORAL
Qty: 4000 ML | Refills: 0 | Status: SHIPPED | OUTPATIENT
Start: 2024-06-07

## 2024-06-18 ENCOUNTER — TELEPHONE (OUTPATIENT)
Dept: PRIMARY CARE | Facility: CLINIC | Age: 69
End: 2024-06-18
Payer: MEDICARE

## 2024-06-18 NOTE — PROGRESS NOTES
I reviewed an email copy of patient’s ambulatory heart rate monitor, which shows no a fib essentially negative

## 2024-07-22 DIAGNOSIS — E29.1 TESTICULAR HYPOFUNCTION: ICD-10-CM

## 2024-07-23 RX ORDER — TESTOSTERONE CYPIONATE 200 MG/ML
INJECTION, SOLUTION INTRAMUSCULAR
Qty: 6 ML | Refills: 3 | Status: SHIPPED | OUTPATIENT
Start: 2024-07-23

## 2024-07-30 ENCOUNTER — APPOINTMENT (OUTPATIENT)
Dept: OTOLARYNGOLOGY | Facility: CLINIC | Age: 69
End: 2024-07-30
Payer: MEDICARE

## 2024-07-30 ENCOUNTER — APPOINTMENT (OUTPATIENT)
Dept: AUDIOLOGY | Facility: CLINIC | Age: 69
End: 2024-07-30
Payer: MEDICARE

## 2024-07-30 VITALS — HEIGHT: 69 IN | BODY MASS INDEX: 34.07 KG/M2 | WEIGHT: 230 LBS

## 2024-07-30 DIAGNOSIS — H61.23 BILATERAL IMPACTED CERUMEN: ICD-10-CM

## 2024-07-30 DIAGNOSIS — H90.A22 SENSORINEURAL HEARING LOSS (SNHL) OF LEFT EAR WITH RESTRICTED HEARING OF RIGHT EAR: ICD-10-CM

## 2024-07-30 DIAGNOSIS — H90.A22 SENSORINEURAL HEARING LOSS (SNHL) OF LEFT EAR WITH RESTRICTED HEARING OF RIGHT EAR: Primary | ICD-10-CM

## 2024-07-30 DIAGNOSIS — H90.3 ASNHL (ASYMMETRICAL SENSORINEURAL HEARING LOSS): Primary | ICD-10-CM

## 2024-07-30 DIAGNOSIS — I10 HYPERTENSION, UNSPECIFIED TYPE: ICD-10-CM

## 2024-07-30 PROCEDURE — 69210 REMOVE IMPACTED EAR WAX UNI: CPT | Performed by: OTOLARYNGOLOGY

## 2024-07-30 PROCEDURE — 3008F BODY MASS INDEX DOCD: CPT | Performed by: OTOLARYNGOLOGY

## 2024-07-30 PROCEDURE — 1036F TOBACCO NON-USER: CPT | Performed by: OTOLARYNGOLOGY

## 2024-07-30 PROCEDURE — 92552 PURE TONE AUDIOMETRY AIR: CPT

## 2024-07-30 PROCEDURE — 1160F RVW MEDS BY RX/DR IN RCRD: CPT | Performed by: OTOLARYNGOLOGY

## 2024-07-30 PROCEDURE — 1159F MED LIST DOCD IN RCRD: CPT | Performed by: OTOLARYNGOLOGY

## 2024-07-30 PROCEDURE — 99212 OFFICE O/P EST SF 10 MIN: CPT | Performed by: OTOLARYNGOLOGY

## 2024-07-30 RX ORDER — OLMESARTAN MEDOXOMIL 20 MG/1
20 TABLET ORAL DAILY
Qty: 90 TABLET | Refills: 3 | Status: SHIPPED | OUTPATIENT
Start: 2024-07-30

## 2024-07-30 NOTE — PROGRESS NOTES
HEARING AID CHECK    Name:  Michael Tam  :  1955  Age:  69 y.o.    HEARING AID INFORMATION:    Left Hearing Aid  Oticon Real 1 miniRITE-R  SN: [id number]  Warranty: 2026    Smart   SN: [id number]   Length  Left: 3(85)  Dome/Earmold  Left: 8mm power  Wax Filter  ProWax miniFit  Battery Size  Rechargeable  TLC Expiration:  2027      HISTORY:    Patient was seen for routine check of the above device following recheck of his hearing (which was stable).  He denied any concerns or complaints at this time, however, he does not perceive much benefit from the hearing aid.    EXAM/PROCEDURES:    Counseled patient that if he does not perceive benefit from the hearing aid, he he always has the ability to purchase the CROS and have his current hearing aid reprogrammed for the right side.  Patient declined at this time.    Visual inspection revealed minimal cerumen accumulation.  Initial listening check revealed possibly diminished amplification.  Cleaned and checked hearing aids.  Vacuumed cely ports and  ports.  Completed Redux dehumidification treatment for patient's hearing aids where <0.5 uL of moisture was removed during a 2:55 minute cycle. Replaced wax filters and domes. Second listening check indicated still diminished sound quality. Attached a new  which improved the sound quality and amplification, which patient also reported noticeable improvement.    Confirmed no firmware update was needed.  Dispensed 1 package of domes and 2 packages of wax guards.    RECOMMENDATIONS AND FOLLOW-UP:    - Continue use of amplification and follow-up as recommended for hearing aid maintenance and adjustments.  - Recommended 6-month follow-up HAC. Patient to contact the office sooner if problems arise.  - Monitor and recheck hearing as warranted.  - Counseled regarding results and recommendations.      Annika Roth  Clinical Audiologist

## 2024-07-30 NOTE — PROGRESS NOTES
Subjective   Patient ID: Michael Tam is a 69 y.o. male  HPI  Patient presents for follow-up for bilateral sensorineural hearing loss with asymmetry of the left side.  He continues to use the hearing aids and he has not noticed any change in the hearing recently.  Review of Systems    Objective   Physical Exam  There is cerumen impaction bilaterally and this was cleared using speculum and curettes.  The tympanic membranes are clear and mobile.  A repeat hearing test was obtained today and reveals bilateral sensorineural hearing loss with asymmetry on the left side.  The last hearing test from August 2023 was reviewed and there is no change of the hearing noted.    Ear cerumen removal    Date/Time: 7/30/2024 12:20 PM    Performed by: Rob Barcenas MD  Authorized by: Rob Barcenas MD    Consent:     Consent obtained:  Verbal    Risks discussed:  Pain  Procedure details:     Location:  L ear and R ear    Procedure type: curette        Assessment/Plan   Diagnoses and all orders for this visit:  ASNHL (asymmetrical sensorineural hearing loss) (Primary)  -     Comprehensive hearing test; Future  Sensorineural hearing loss (SNHL) of left ear with restricted hearing of right ear  -     Comprehensive hearing test; Future  Bilateral impacted cerumen  -     Ear cerumen removal     1. Bilateral sensorineural hearing loss with asymmetry on the left side with no evidence of intracranial or retrocochlear lesion and no change of the hearing noted over the last year.  A repeat hearing test will be obtained in 1 year.  2.  Bilateral cerumen impaction cleared today.  He will follow-up in 1 year.

## 2024-07-30 NOTE — PROGRESS NOTES
AUDIOLOGIC EVALUATION    Name:  Michael Tam  :  1955  Age:  69 y.o.    HISTORY:     Patient was seen for recheck of his asymmetric hearing loss prior to hearing aid check and follow-up with Dr. Barcenas.  He denied any changes in his hearing however, he feels his hearing aid is not providing much benefit.  He denied tinnitus, otalgia, aural pressure/fullness, otorrhea, and dizziness.    EVALUATION:    See Audiogram.    RESULTS:    Otoscopy:  Right: Moderate non-occluding cerumen accumulation, tympanic membrane not fully visible.  Left: Clear canal, normal color and appearance of tympanic membrane.    Tympanometry:  Did not test.    Hearing Sensitivity:  Right: Hearing within normal limits through 3000 Hz, precipitously sloping to moderately severe/severe sensorineural hearing loss.  Left: Moderately severe rising to mild through 2000 Hz, sloping again to moderate/moderately severe sensorineural hearing loss.    Word Recognition Score:  Did not test.    IMPRESSIONS:    Compared to previous hearing evaluation on 2023, hearing remains stable, bilaterally.    ASSESSMENT AND PLAN:    - Continue medical follow-up with Dr. Barcenas.  - Continue use of amplification and follow-up as recommended for hearing aid maintenance and adjustments.  - Monitor and recheck hearing as warranted.  - Counseled regarding results and recommendations.      Annika Roth  Clinical Audiologist

## 2024-08-07 ENCOUNTER — APPOINTMENT (OUTPATIENT)
Dept: GASTROENTEROLOGY | Facility: EXTERNAL LOCATION | Age: 69
End: 2024-08-07
Payer: MEDICARE

## 2024-08-07 DIAGNOSIS — D12.4 BENIGN NEOPLASM OF DESCENDING COLON: ICD-10-CM

## 2024-08-07 DIAGNOSIS — Z12.11 COLON CANCER SCREENING: Primary | ICD-10-CM

## 2024-08-07 DIAGNOSIS — Z12.11 COLON CANCER SCREENING: ICD-10-CM

## 2024-08-07 DIAGNOSIS — Z86.010 HX OF COLONIC POLYPS: ICD-10-CM

## 2024-08-07 PROCEDURE — 0753T DGTZ GLS MCRSCP SLD LEVEL IV: CPT

## 2024-08-07 PROCEDURE — 45385 COLONOSCOPY W/LESION REMOVAL: CPT | Performed by: INTERNAL MEDICINE

## 2024-08-07 PROCEDURE — 88305 TISSUE EXAM BY PATHOLOGIST: CPT

## 2024-08-08 ENCOUNTER — LAB REQUISITION (OUTPATIENT)
Dept: LAB | Facility: HOSPITAL | Age: 69
End: 2024-08-08
Payer: MEDICARE

## 2024-08-12 DIAGNOSIS — I10 PRIMARY HYPERTENSION: Primary | ICD-10-CM

## 2024-08-12 RX ORDER — OLMESARTAN MEDOXOMIL AND HYDROCHLOROTHIAZIDE 20/12.5 20; 12.5 MG/1; MG/1
1 TABLET ORAL DAILY
Qty: 90 TABLET | Refills: 3 | Status: SHIPPED | OUTPATIENT
Start: 2024-08-12 | End: 2024-11-10

## 2024-08-14 LAB
LABORATORY COMMENT REPORT: NORMAL
PATH REPORT.FINAL DX SPEC: NORMAL
PATH REPORT.GROSS SPEC: NORMAL
PATH REPORT.RELEVANT HX SPEC: NORMAL
PATH REPORT.TOTAL CANCER: NORMAL

## 2024-08-19 ENCOUNTER — APPOINTMENT (OUTPATIENT)
Dept: ENDOCRINOLOGY | Facility: CLINIC | Age: 69
End: 2024-08-19
Payer: MEDICARE

## 2024-08-20 ENCOUNTER — OFFICE VISIT (OUTPATIENT)
Dept: ALLERGY | Facility: CLINIC | Age: 69
End: 2024-08-20
Payer: MEDICARE

## 2024-08-20 DIAGNOSIS — J31.0 CHRONIC RHINITIS: ICD-10-CM

## 2024-08-20 DIAGNOSIS — G47.33 OSA (OBSTRUCTIVE SLEEP APNEA): ICD-10-CM

## 2024-08-20 DIAGNOSIS — J45.40 MODERATE PERSISTENT ASTHMA WITHOUT COMPLICATION (HHS-HCC): Primary | ICD-10-CM

## 2024-08-20 PROCEDURE — 99214 OFFICE O/P EST MOD 30 MIN: CPT | Performed by: ALLERGY & IMMUNOLOGY

## 2024-08-20 RX ORDER — FLUTICASONE PROPIONATE AND SALMETEROL 500; 50 UG/1; UG/1
1 POWDER RESPIRATORY (INHALATION) 2 TIMES DAILY
Qty: 180 EACH | Refills: 3 | Status: SHIPPED | OUTPATIENT
Start: 2024-08-20

## 2024-08-20 RX ORDER — ALBUTEROL SULFATE 90 UG/1
1-2 INHALANT RESPIRATORY (INHALATION) EVERY 4 HOURS PRN
Qty: 18 G | Refills: 5 | Status: SHIPPED | OUTPATIENT
Start: 2024-08-20

## 2024-08-20 NOTE — PROGRESS NOTES
Subjective   Patient ID:   [id number]   Michael Tam is a 69 y.o. male who presents for Asthma and Sleep Apnea.    Chief Complaint   Patient presents with    Asthma    Sleep Apnea          Asthma  His past medical history is significant for asthma.     This patient is here to evaluate for:  This patient's last visit was 8/29/23    Asthma - no problems.   A little shortness of breath that he attributes to weight gain and his age.   No wheeze  When he goes back to [place], he is more active.   Hasn't used albuterol in years. Will keep it on file just in case.     Sensitive to smell, like the garbage bag his wife gets.   Occas sneezing jags.     Obstructive Sleep Apnea - doing well and has enough supplies.   Also has a travel machine, such as when he went to [place].  I don't see a recent sleep order in epic media; but last year scrip given.    SH: working daily  Wife has cancer and dealing with this for 4 years.   Leiomyosarcoma  Seen by [facility]  They are going to [place], then long in [place]  They are active despite the cancer.    Review of Systems   All other systems reviewed and are negative.        Objective     There were no vitals taken for this visit.     Physical Exam  Constitutional:       General: He is not in acute distress.     Appearance: Normal appearance. He is not ill-appearing.   HENT:      Head: Normocephalic and atraumatic.      Right Ear: Tympanic membrane, ear canal and external ear normal.      Left Ear: Tympanic membrane, ear canal and external ear normal.      Nose: Nose normal. No congestion or rhinorrhea.      Mouth/Throat:      Mouth: Mucous membranes are moist.      Pharynx: Oropharynx is clear. No oropharyngeal exudate or posterior oropharyngeal erythema.   Eyes:      General:         Right eye: No discharge.         Left eye: No discharge.      Conjunctiva/sclera: Conjunctivae normal.   Cardiovascular:      Rate and Rhythm: Normal rate and regular rhythm.      Heart sounds: Normal heart sounds. No  murmur heard.     No friction rub. No gallop.   Pulmonary:      Effort: Pulmonary effort is normal. No respiratory distress.      Breath sounds: Normal breath sounds. No stridor. No wheezing, rhonchi or rales.   Chest:      Chest wall: No tenderness.   Abdominal:      General: Abdomen is flat.      Palpations: Abdomen is soft.   Musculoskeletal:         General: Normal range of motion.      Cervical back: Normal range of motion and neck supple.   Lymphadenopathy:      Cervical: No cervical adenopathy.   Skin:     General: Skin is warm and dry.      Findings: No erythema, lesion or rash.   Neurological:      General: No focal deficit present.      Mental Status: He is alert. Mental status is at baseline.   Psychiatric:         Mood and Affect: Mood normal.         Behavior: Behavior normal.         Thought Content: Thought content normal.         Judgment: Judgment normal.            Current Outpatient Medications   Medication Sig Dispense Refill    albuterol (ProAir HFA) 90 mcg/actuation inhaler Inhale 1-2 puffs. EVERY 4 TO 6 HOURS AS NEEDED.      amLODIPine (Norvasc) 5 mg tablet Take 1 tablet (5 mg) by mouth once daily. as directed 90 tablet 3    aspirin (Vince Low Dose Aspirin) 81 mg EC tablet Take 1 tablet (81 mg) by mouth once daily.      colchicine, gout, 0.6 mg tablet Take 2 tablets (1.2 mg) by mouth. NOW AT ONSET OF GOUT, REPEAT 1 TABLET 6 HOURS LATER THEN 1 TABLET DAILY      cyclobenzaprine (Flexeril) 10 mg tablet Take 1 tablet (10 mg) by mouth every 12 hours if needed.      docusate sodium (Colace) 100 mg capsule Take 1 capsule (100 mg) by mouth 2 times a day.      fluticasone (Flonase Sensimist) 27.5 mcg/actuation nasal spray Administer 1 spray into each nostril once daily.      fluticasone propion-salmeteroL (Advair Diskus) 100-50 mcg/dose diskus inhaler Inhale 1 puff 2 times a day.      gabapentin (Neurontin) 300 mg capsule Take 1 capsule (300 mg) by mouth if needed.      levocetirizine (Xyzal) 5 mg  tablet Take 1 tablet (5 mg) by mouth every other day.      metFORMIN  mg 24 hr tablet Take 2 tablets (1,000 mg) by mouth once daily in the morning. 180 tablet 3    olmesartan-hydrochlorothiazide (Benicar HCT) 20-12.5 mg tablet Take 1 tablet by mouth once daily for 90 doses. 90 tablet 3    omeprazole OTC (PriLOSEC OTC) 20 mg EC tablet Take 1 tablet (20 mg) by mouth once daily.      ondansetron (Zofran) 4 mg tablet 1 tablet (4 mg) 3 times a day as needed for nausea.      polyethylene glycol (Miralax) 17 gram packet Take 17 g by mouth once daily as needed.      polyethylene glycol-electrolytes (Nulytely) 420 gram solution Drink 1/2 starting at 6 pm the night before your procedure then drink the 2nd 1/2 5 hours before procedure arrival time 4000 mL 0    sildenafil (Revatio) 20 mg tablet Take 1 tablet (20 mg) by mouth once daily.      sildenafil (Viagra) 100 mg tablet Take 1 tablet (100 mg) by mouth once daily. 1 HOUR BEFORE NEEDED      testosterone cypionate (Depo-Testosterone) 200 mg/mL injection INJECT 1 ML EVERY 2 WEEKS 6 mL 3    tiZANidine (Zanaflex) 4 mg tablet Take 1 tablet (4 mg) by mouth if needed.      triamcinolone (Kenalog) 0.1 % cream Apply topically twice a day.      Wixela Inhub 500-50 mcg/dose diskus inhaler Inhale 1 puff 2 times a day.       No current facility-administered medications for this visit.       Summary of the labs over the past 6 months:    Lab Requisition on 08/07/2024   Component Date Value Ref Range Status    Case Report 08/07/2024    Final                    Value:Surgical Pathology                                Case: [id number]                                  Authorizing Provider:  Jamie Owen MD      Collected:           08/07/2024 1031              Ordering Location:     [facility]       Received:            08/08/2024 0142                                     Center                                                                       Pathologist:            "Bony Guerin MD                                                                           Specimen:    COLON - DESCENDING POLYP, polyp, cold snare                                                FINAL DIAGNOSIS 08/07/2024    Final                    Value:  A. COLON - DESCENDING POLYP, COLD SNARE BIOPSY:              -Tubular adenoma.            08/07/2024    Final                    Value:By the signature on this report, the individual or group listed as making the Final Interpretation/Diagnosis certifies that they have reviewed this case.       Clinical History 08/07/2024    Final                    Value:High risk colon cancer surveillance.   Personal history of adenoma less than 10 mm in size.  Last colonoscopy: 2015  Last colonoscopy: 2018      Gross Description 08/07/2024    Final                    Value:A: Received in formalin, labeled with the patient's name and hospital number and \"1 colon, descending polyp, cold snare, polypectomy\", are 2 fragments  of tan, soft tissue aggregating to 1.3 x 0.3 x 0.3 cm. The specimen is submitted in toto in one cassette.  DMB       Lab on 06/03/2024   Component Date Value Ref Range Status    Albumin, Urine Random 06/03/2024 <7.0  Not established mg/L Final    Creatinine, Urine Random 06/03/2024 41.0  20.0 - 370.0 mg/dL Final    Albumin/Creatinine Ratio 06/03/2024    Final    Hemoglobin A1C 06/03/2024 5.3  see below % Final    Estimated Average Glucose 06/03/2024 105  Not Established mg/dL Final    Cholesterol 06/03/2024 137  0 - 199 mg/dL Final    HDL-Cholesterol 06/03/2024 67.2  mg/dL Final    Cholesterol/HDL Ratio 06/03/2024 2.0   Final    LDL Calculated 06/03/2024 59  <=99 mg/dL Final    VLDL 06/03/2024 11  0 - 40 mg/dL Final    Triglycerides 06/03/2024 55  0 - 149 mg/dL Final    Non HDL Cholesterol 06/03/2024 70  0 - 149 mg/dL Final    WBC 06/03/2024 11.6 (H)  4.4 - 11.3 x10*3/uL " Final    nRBC 06/03/2024 0.0  0.0 - 0.0 /100 WBCs Final    RBC 06/03/2024 4.88  4.50 - 5.90 x10*6/uL Final    Hemoglobin 06/03/2024 15.4  13.5 - 17.5 g/dL Final    Hematocrit 06/03/2024 47.8  41.0 - 52.0 % Final    MCV 06/03/2024 98  80 - 100 fL Final    MCH 06/03/2024 31.6  26.0 - 34.0 pg Final    MCHC 06/03/2024 32.2  32.0 - 36.0 g/dL Final    RDW 06/03/2024 14.6 (H)  11.5 - 14.5 % Final    Platelets 06/03/2024 266  150 - 450 x10*3/uL Final    Glucose 06/03/2024 96  74 - 99 mg/dL Final    Sodium 06/03/2024 137  136 - 145 mmol/L Final    Potassium 06/03/2024 4.4  3.5 - 5.3 mmol/L Final    Chloride 06/03/2024 98  98 - 107 mmol/L Final    Bicarbonate 06/03/2024 29  21 - 32 mmol/L Final    Anion Gap 06/03/2024 14  10 - 20 mmol/L Final    Urea Nitrogen 06/03/2024 25 (H)  6 - 23 mg/dL Final    Creatinine 06/03/2024 1.13  0.50 - 1.30 mg/dL Final    eGFR 06/03/2024 70  >60 mL/min/1.73m*2 Final    Calcium 06/03/2024 9.9  8.6 - 10.6 mg/dL Final    Albumin 06/03/2024 4.5  3.4 - 5.0 g/dL Final    Alkaline Phosphatase 06/03/2024 60  33 - 136 U/L Final    Total Protein 06/03/2024 7.0  6.4 - 8.2 g/dL Final    AST 06/03/2024 28  9 - 39 U/L Final    Bilirubin, Total 06/03/2024 1.3 (H)  0.0 - 1.2 mg/dL Final    ALT 06/03/2024 29  10 - 52 U/L Final    Prostate Specific AG 06/03/2024 1.35  <=4.00 ng/mL Final   Lab Requisition on 05/24/2024   Component Date Value Ref Range Status    Case Report 05/24/2024    Final                    Value:Dermatopathology                                  Case: [id number]                                   Authorizing Provider:  Haley Tapia DO          Collected:           05/24/2024 0800              Ordering Location:     [facility]       Received:            05/28/2024 0953                                     [facility]                                                               Pathologist:           Yoandy Lauar MD                                                              Specimen:    SKIN, LEFT ANTERIOR LOWER LEG-PROXIMAL                                                     FINAL DIAGNOSIS 05/24/2024    Final                    Value:SKIN, LEFT ANTERIOR LOWER LEG - PROXIMAL, SHAVE BIOPSY:                          SQUAMOUS CELL CARCINOMA IN SITU, PRESENT ON THE DEEP MARGIN.                                                    ** Electronically signed out by Yoandy Laura MD **                                05/24/2024    Final                    Value:By the signature on this report, the individual or group listed as making                           the Final Interpretation/Diagnosis certifies that they have reviewed this                           case.     Clinical History 05/24/2024    Final                    Value:Neoplasm of uncertain behavior of skin: R/O Basal Cell Carcinoma of skin.                           Pearly papule with telangiectasia.                                                                                   Microscopic Description 05/24/2024    Final                    Value:Microscopic analysis shows hyperplastic epidermis with full-thickness                           atypia of keratinocytes.                                                        Gross Description 05/24/2024    Final                    Value:A:                           Received in formalin is a 9 x 8 x 1 mm piece of skin.  It is tan and                           brown in color.  It is shave in shape.  It was embedded in toto.  The                           specimen was inked.                                                      The specimen was grossed by Marline Velazquez.          Assessment/Plan   Diagnoses and all orders for this visit:  Moderate persistent asthma without complication (HHS-HCC)  Chronic rhinitis  NATY (obstructive sleep apnea)    Glad that you are doing well  Med refills    Follow-up in 1 year so we may re-assess you and develop a more long term plan.     Devyn Cannon,  MD

## 2024-09-23 DIAGNOSIS — E78.5 HYPERLIPIDEMIA, UNSPECIFIED HYPERLIPIDEMIA TYPE: Primary | ICD-10-CM

## 2024-09-23 RX ORDER — ATORVASTATIN CALCIUM 40 MG/1
40 TABLET, FILM COATED ORAL DAILY
Qty: 100 TABLET | Refills: 3 | Status: SHIPPED | OUTPATIENT
Start: 2024-09-23 | End: 2025-10-28

## 2024-11-16 DIAGNOSIS — I10 HYPERTENSION, UNSPECIFIED TYPE: ICD-10-CM

## 2024-11-18 RX ORDER — AMLODIPINE BESYLATE 5 MG/1
5 TABLET ORAL DAILY
Qty: 90 TABLET | Refills: 3 | Status: SHIPPED | OUTPATIENT
Start: 2024-11-18

## 2024-11-19 ENCOUNTER — OFFICE VISIT (OUTPATIENT)
Dept: ENDOCRINOLOGY | Facility: CLINIC | Age: 69
End: 2024-11-19
Payer: MEDICARE

## 2024-11-19 VITALS — SYSTOLIC BLOOD PRESSURE: 134 MMHG | BODY MASS INDEX: 35.59 KG/M2 | WEIGHT: 241 LBS | DIASTOLIC BLOOD PRESSURE: 78 MMHG

## 2024-11-19 DIAGNOSIS — N40.0 BENIGN PROSTATIC HYPERPLASIA, UNSPECIFIED WHETHER LOWER URINARY TRACT SYMPTOMS PRESENT: ICD-10-CM

## 2024-11-19 DIAGNOSIS — I10 PRIMARY HYPERTENSION: ICD-10-CM

## 2024-11-19 DIAGNOSIS — E88.810 DYSMETABOLIC SYNDROME X: ICD-10-CM

## 2024-11-19 DIAGNOSIS — R73.01 IFG (IMPAIRED FASTING GLUCOSE): ICD-10-CM

## 2024-11-19 DIAGNOSIS — R73.01 IMPAIRED FASTING GLUCOSE: ICD-10-CM

## 2024-11-19 DIAGNOSIS — E29.1 HYPOGONADISM MALE: Primary | ICD-10-CM

## 2024-11-19 PROCEDURE — 3078F DIAST BP <80 MM HG: CPT | Performed by: INTERNAL MEDICINE

## 2024-11-19 PROCEDURE — 90662 IIV NO PRSV INCREASED AG IM: CPT | Performed by: INTERNAL MEDICINE

## 2024-11-19 PROCEDURE — G0008 ADMIN INFLUENZA VIRUS VAC: HCPCS | Performed by: INTERNAL MEDICINE

## 2024-11-19 PROCEDURE — 99214 OFFICE O/P EST MOD 30 MIN: CPT | Performed by: INTERNAL MEDICINE

## 2024-11-19 PROCEDURE — 3075F SYST BP GE 130 - 139MM HG: CPT | Performed by: INTERNAL MEDICINE

## 2024-11-19 NOTE — PROGRESS NOTES
Patient ID: Michael Tam is a 69 y.o. male who presents for Follow-up.  HPI  The patient comes in for follow up.    He was last seen August 22, 2023.    He has hypogonadism hyperlipidemia impaired fasting glucose asymptomatic gallstones hypertension NATY back pain fatty liver.    He has been working on diet but has not been consistent.    He continues on testosterone injections 200 mg every 2 weeks.    He will be due for his next injection next Thursday.    He has had no ill effects from testosterone.    He continues on metformin ER 2 tablets daily.    He is complaining of back pain.    Physical he has no other complaints.    ROS  Comprehensive review of systems is negative.    Objective   Physical Exam  Visit Vitals  /78      Vitals:    11/19/24 1229   Weight: 109 kg (241 lb)      Body mass index is 35.59 kg/m².      Weight 241 down 1 pound for total of 48    Eyes normal  ENT normal. No adenopathy  Thyroid palpable and normal. No nodules  Chest clear to auscultation  Heart sounds are normal  Abdomen nontender. Bowel sounds normal. No organomegaly  Feet are okay    Current Outpatient Medications   Medication Sig Dispense Refill    albuterol (ProAir HFA) 90 mcg/actuation inhaler Inhale 1-2 puffs every 4 hours if needed for wheezing or shortness of breath. EVERY 4 TO 6 HOURS AS NEEDED. 18 g 5    amLODIPine (Norvasc) 5 mg tablet TAKE 1 TABLET (5 MG) BY MOUTH ONCE DAILY. AS DIRECTED 90 tablet 3    aspirin (Vince Low Dose Aspirin) 81 mg EC tablet Take 1 tablet (81 mg) by mouth once daily.      atorvastatin (Lipitor) 40 mg tablet Take 1 tablet (40 mg) by mouth once daily. 100 tablet 3    colchicine, gout, 0.6 mg tablet Take 2 tablets (1.2 mg) by mouth. NOW AT ONSET OF GOUT, REPEAT 1 TABLET 6 HOURS LATER THEN 1 TABLET DAILY      cyclobenzaprine (Flexeril) 10 mg tablet Take 1 tablet (10 mg) by mouth every 12 hours if needed.      docusate sodium (Colace) 100 mg capsule Take 1 capsule (100 mg) by mouth 2 times a day.       fluticasone (Flonase Sensimist) 27.5 mcg/actuation nasal spray Administer 1 spray into each nostril once daily.      fluticasone propion-salmeteroL (Advair Diskus) 100-50 mcg/dose diskus inhaler Inhale 1 puff 2 times a day.      gabapentin (Neurontin) 300 mg capsule Take 1 capsule (300 mg) by mouth if needed.      levocetirizine (Xyzal) 5 mg tablet Take 1 tablet (5 mg) by mouth every other day.      metFORMIN  mg 24 hr tablet Take 2 tablets (1,000 mg) by mouth once daily in the morning. 180 tablet 3    olmesartan-hydrochlorothiazide (Benicar HCT) 20-12.5 mg tablet Take 1 tablet by mouth once daily for 90 doses. 90 tablet 3    omeprazole OTC (PriLOSEC OTC) 20 mg EC tablet Take 1 tablet (20 mg) by mouth once daily.      ondansetron (Zofran) 4 mg tablet 1 tablet (4 mg) 3 times a day as needed for nausea.      polyethylene glycol (Miralax) 17 gram packet Take 17 g by mouth once daily as needed.      polyethylene glycol-electrolytes (Nulytely) 420 gram solution Drink 1/2 starting at 6 pm the night before your procedure then drink the 2nd 1/2 5 hours before procedure arrival time 4000 mL 0    sildenafil (Revatio) 20 mg tablet Take 1 tablet (20 mg) by mouth once daily.      sildenafil (Viagra) 100 mg tablet Take 1 tablet (100 mg) by mouth once daily. 1 HOUR BEFORE NEEDED      testosterone cypionate (Depo-Testosterone) 200 mg/mL injection INJECT 1 ML EVERY 2 WEEKS 6 mL 3    tiZANidine (Zanaflex) 4 mg tablet Take 1 tablet (4 mg) by mouth if needed.      triamcinolone (Kenalog) 0.1 % cream Apply topically twice a day.      Wixela Inhub 500-50 mcg/dose diskus inhaler Inhale 1 puff 2 times a day. 180 each 3     No current facility-administered medications for this visit.       Assessment/Plan     1.  Hypogonadism  2.  Impaired fasting glucose  3.  Hypertension  4.  Hyperlipidemia    He will continue his current regimen.    I have personally reviewed the OARRS report for this patient. This report is scanned into the  electronic medical record. I consider the risks of abuse, dependence, addiction and diversion. I believe that is clinically appropriate for this patient to be prescribed this medication.    Prior to his next injection we will get testosterone CBC PSA TSH BMP and hemoglobin A1c.    He will work on diet and exercise.    We again discussed the impact of alcohol.    He will get a high-dose flu shot today.    He will follow-up with me in 1 year or sooner as needed.

## 2024-11-27 ENCOUNTER — LAB (OUTPATIENT)
Dept: LAB | Facility: LAB | Age: 69
End: 2024-11-27
Payer: MEDICARE

## 2024-11-27 DIAGNOSIS — N40.1 BENIGN PROSTATIC HYPERPLASIA WITH LOWER URINARY TRACT SYMPTOMS, SYMPTOM DETAILS UNSPECIFIED: ICD-10-CM

## 2024-11-27 DIAGNOSIS — N40.0 BENIGN PROSTATIC HYPERPLASIA, UNSPECIFIED WHETHER LOWER URINARY TRACT SYMPTOMS PRESENT: ICD-10-CM

## 2024-11-27 DIAGNOSIS — M10.9 GOUT, UNSPECIFIED CAUSE, UNSPECIFIED CHRONICITY, UNSPECIFIED SITE: ICD-10-CM

## 2024-11-27 DIAGNOSIS — R73.01 IFG (IMPAIRED FASTING GLUCOSE): ICD-10-CM

## 2024-11-27 DIAGNOSIS — E29.1 HYPOGONADISM MALE: ICD-10-CM

## 2024-11-27 LAB
ANION GAP SERPL CALC-SCNC: 11 MMOL/L (ref 10–20)
BUN SERPL-MCNC: 24 MG/DL (ref 6–23)
CALCIUM SERPL-MCNC: 9.2 MG/DL (ref 8.6–10.6)
CHLORIDE SERPL-SCNC: 99 MMOL/L (ref 98–107)
CO2 SERPL-SCNC: 31 MMOL/L (ref 21–32)
CREAT SERPL-MCNC: 1.12 MG/DL (ref 0.5–1.3)
EGFRCR SERPLBLD CKD-EPI 2021: 71 ML/MIN/1.73M*2
ERYTHROCYTE [DISTWIDTH] IN BLOOD BY AUTOMATED COUNT: 13.2 % (ref 11.5–14.5)
EST. AVERAGE GLUCOSE BLD GHB EST-MCNC: 114 MG/DL
GLUCOSE SERPL-MCNC: 99 MG/DL (ref 74–99)
HBA1C MFR BLD: 5.6 %
HCT VFR BLD AUTO: 47.1 % (ref 41–52)
HGB BLD-MCNC: 15.4 G/DL (ref 13.5–17.5)
MCH RBC QN AUTO: 32 PG (ref 26–34)
MCHC RBC AUTO-ENTMCNC: 32.7 G/DL (ref 32–36)
MCV RBC AUTO: 98 FL (ref 80–100)
NRBC BLD-RTO: 0 /100 WBCS (ref 0–0)
PLATELET # BLD AUTO: 259 X10*3/UL (ref 150–450)
POTASSIUM SERPL-SCNC: 4.3 MMOL/L (ref 3.5–5.3)
PSA SERPL-MCNC: 1.17 NG/ML
RBC # BLD AUTO: 4.81 X10*6/UL (ref 4.5–5.9)
SODIUM SERPL-SCNC: 137 MMOL/L (ref 136–145)
TESTOST SERPL-MCNC: 239 NG/DL (ref 240–1000)
TSH SERPL-ACNC: 1.09 MIU/L (ref 0.44–3.98)
WBC # BLD AUTO: 9.3 X10*3/UL (ref 4.4–11.3)

## 2024-11-27 PROCEDURE — 85027 COMPLETE CBC AUTOMATED: CPT

## 2024-11-27 PROCEDURE — 83036 HEMOGLOBIN GLYCOSYLATED A1C: CPT

## 2024-11-27 PROCEDURE — 36415 COLL VENOUS BLD VENIPUNCTURE: CPT

## 2024-11-27 PROCEDURE — 80048 BASIC METABOLIC PNL TOTAL CA: CPT

## 2024-11-27 PROCEDURE — 84403 ASSAY OF TOTAL TESTOSTERONE: CPT

## 2024-11-27 PROCEDURE — 84153 ASSAY OF PSA TOTAL: CPT

## 2024-11-27 PROCEDURE — 84443 ASSAY THYROID STIM HORMONE: CPT

## 2024-12-02 RX ORDER — COLCHICINE 0.6 MG/1
1.2 TABLET ORAL DAILY
Qty: 30 TABLET | Refills: 2 | Status: SHIPPED | OUTPATIENT
Start: 2024-12-02

## 2024-12-03 ENCOUNTER — APPOINTMENT (OUTPATIENT)
Dept: UROLOGY | Facility: CLINIC | Age: 69
End: 2024-12-03
Payer: MEDICARE

## 2024-12-03 VITALS
WEIGHT: 244 LBS | SYSTOLIC BLOOD PRESSURE: 103 MMHG | HEIGHT: 68 IN | DIASTOLIC BLOOD PRESSURE: 65 MMHG | HEART RATE: 60 BPM | TEMPERATURE: 97.2 F | BODY MASS INDEX: 36.98 KG/M2

## 2024-12-03 DIAGNOSIS — N40.1 BENIGN PROSTATIC HYPERPLASIA WITH LOWER URINARY TRACT SYMPTOMS, SYMPTOM DETAILS UNSPECIFIED: Primary | ICD-10-CM

## 2024-12-03 LAB
POC APPEARANCE, URINE: CLEAR
POC BILIRUBIN, URINE: NEGATIVE
POC BLOOD, URINE: ABNORMAL
POC COLOR, URINE: YELLOW
POC GLUCOSE, URINE: NEGATIVE MG/DL
POC KETONES, URINE: NEGATIVE MG/DL
POC LEUKOCYTES, URINE: ABNORMAL
POC NITRITE,URINE: NEGATIVE
POC PH, URINE: 6.5 PH
POC PROTEIN, URINE: NEGATIVE MG/DL
POC SPECIFIC GRAVITY, URINE: 1.01
POC UROBILINOGEN, URINE: 0.2 EU/DL

## 2024-12-03 PROCEDURE — 1159F MED LIST DOCD IN RCRD: CPT | Performed by: PHYSICIAN ASSISTANT

## 2024-12-03 PROCEDURE — 3074F SYST BP LT 130 MM HG: CPT | Performed by: PHYSICIAN ASSISTANT

## 2024-12-03 PROCEDURE — 3078F DIAST BP <80 MM HG: CPT | Performed by: PHYSICIAN ASSISTANT

## 2024-12-03 PROCEDURE — 81003 URINALYSIS AUTO W/O SCOPE: CPT | Performed by: PHYSICIAN ASSISTANT

## 2024-12-03 PROCEDURE — 3008F BODY MASS INDEX DOCD: CPT | Performed by: PHYSICIAN ASSISTANT

## 2024-12-03 PROCEDURE — 99214 OFFICE O/P EST MOD 30 MIN: CPT | Performed by: PHYSICIAN ASSISTANT

## 2024-12-03 PROCEDURE — 87086 URINE CULTURE/COLONY COUNT: CPT

## 2024-12-03 PROCEDURE — 51798 US URINE CAPACITY MEASURE: CPT | Performed by: PHYSICIAN ASSISTANT

## 2024-12-03 PROCEDURE — 81001 URINALYSIS AUTO W/SCOPE: CPT

## 2024-12-03 ASSESSMENT — ENCOUNTER SYMPTOMS
CARDIOVASCULAR NEGATIVE: 1
CONSTITUTIONAL NEGATIVE: 1
RESPIRATORY NEGATIVE: 1
ALLERGIC/IMMUNOLOGIC NEGATIVE: 1
PSYCHIATRIC NEGATIVE: 1
HEMATOLOGIC/LYMPHATIC NEGATIVE: 1
GASTROINTESTINAL NEGATIVE: 1
NEUROLOGICAL NEGATIVE: 1
EYES NEGATIVE: 1
ENDOCRINE NEGATIVE: 1
MUSCULOSKELETAL NEGATIVE: 1

## 2024-12-03 NOTE — PROGRESS NOTES
Subjective   Patient ID: Michael Tam is a 69 y.o. male who presents for Benign Prostatic Hypertrophy.  Benign Prostatic Hypertrophy      69-year-old gentleman presents for follow-up.     He has a history of hypogonadism on testosterone replacement managed by Dr. Cortez, BPH and history of microscopic hematuria. He has no complaints today. He denies any dysuria, gross hematuria, flank pain, fevers or chills.     UA moderate blood  PVR 48    Review of Systems   Constitutional: Negative.    HENT: Negative.     Eyes: Negative.    Respiratory: Negative.     Cardiovascular: Negative.    Gastrointestinal: Negative.    Endocrine: Negative.    Genitourinary: Negative.    Musculoskeletal: Negative.    Skin: Negative.    Allergic/Immunologic: Negative.    Neurological: Negative.    Hematological: Negative.    Psychiatric/Behavioral: Negative.         Objective   Physical Exam  Constitutional:       General: He is not in acute distress.     Appearance: Normal appearance.   HENT:      Head: Normocephalic and atraumatic.      Nose: Nose normal.      Mouth/Throat:      Mouth: Mucous membranes are moist.   Cardiovascular:      Rate and Rhythm: Normal rate.   Pulmonary:      Effort: Pulmonary effort is normal.   Abdominal:      General: Abdomen is flat.      Palpations: Abdomen is soft.   Genitourinary:     Penis: Normal.       Testes: Normal.   Musculoskeletal:      Cervical back: Normal range of motion.   Neurological:      Mental Status: He is alert.         Assessment/Plan     Microscopic hematuria    Urine sent for microscopy and culture  I discussed when to repeat microscopic hematuria  Repeat urine microscopy yearly    BPH  Urinary symptoms stable    Follow up in 1 year or sooner as needed           Gopi Banegas PA-C 12/03/24 8:53 AM

## 2024-12-04 LAB
APPEARANCE UR: CLEAR
BILIRUB UR STRIP.AUTO-MCNC: NEGATIVE MG/DL
COLOR UR: ABNORMAL
GLUCOSE UR STRIP.AUTO-MCNC: NORMAL MG/DL
KETONES UR STRIP.AUTO-MCNC: NEGATIVE MG/DL
LEUKOCYTE ESTERASE UR QL STRIP.AUTO: NEGATIVE
MUCOUS THREADS #/AREA URNS AUTO: ABNORMAL /LPF
NITRITE UR QL STRIP.AUTO: NEGATIVE
PH UR STRIP.AUTO: 6.5 [PH]
PROT UR STRIP.AUTO-MCNC: NEGATIVE MG/DL
RBC # UR STRIP.AUTO: ABNORMAL /UL
RBC #/AREA URNS AUTO: >20 /HPF
SP GR UR STRIP.AUTO: 1.01
SQUAMOUS #/AREA URNS AUTO: ABNORMAL /HPF
UROBILINOGEN UR STRIP.AUTO-MCNC: NORMAL MG/DL
WBC #/AREA URNS AUTO: ABNORMAL /HPF

## 2024-12-05 LAB — BACTERIA UR CULT: NO GROWTH

## 2024-12-09 DIAGNOSIS — M10.9 GOUT, UNSPECIFIED CAUSE, UNSPECIFIED CHRONICITY, UNSPECIFIED SITE: ICD-10-CM

## 2024-12-09 RX ORDER — COLCHICINE 0.6 MG/1
1.2 TABLET ORAL DAILY
Qty: 30 TABLET | Refills: 2 | Status: SHIPPED | OUTPATIENT
Start: 2024-12-09

## 2024-12-17 ENCOUNTER — APPOINTMENT (OUTPATIENT)
Dept: ENDOCRINOLOGY | Facility: CLINIC | Age: 69
End: 2024-12-17
Payer: MEDICARE

## 2025-01-13 DIAGNOSIS — M10.9 GOUT, UNSPECIFIED CAUSE, UNSPECIFIED CHRONICITY, UNSPECIFIED SITE: ICD-10-CM

## 2025-01-14 RX ORDER — COLCHICINE 0.6 MG/1
1.2 TABLET ORAL DAILY
Qty: 30 TABLET | Refills: 2 | Status: SHIPPED | OUTPATIENT
Start: 2025-01-14

## 2025-01-22 DIAGNOSIS — M10.9 GOUT, UNSPECIFIED CAUSE, UNSPECIFIED CHRONICITY, UNSPECIFIED SITE: ICD-10-CM

## 2025-01-22 RX ORDER — COLCHICINE 0.6 MG/1
TABLET ORAL
Qty: 180 TABLET | Refills: 1 | Status: SHIPPED | OUTPATIENT
Start: 2025-01-22

## 2025-02-09 DIAGNOSIS — R73.01 IMPAIRED FASTING GLUCOSE: ICD-10-CM

## 2025-02-10 DIAGNOSIS — E29.1 TESTICULAR HYPOFUNCTION: ICD-10-CM

## 2025-02-10 RX ORDER — TESTOSTERONE CYPIONATE 200 MG/ML
INJECTION, SOLUTION INTRAMUSCULAR
Qty: 6 ML | Refills: 3 | Status: SHIPPED | OUTPATIENT
Start: 2025-02-10

## 2025-02-10 RX ORDER — METFORMIN HYDROCHLORIDE 500 MG/1
TABLET, EXTENDED RELEASE ORAL
Qty: 180 TABLET | Refills: 1 | Status: SHIPPED | OUTPATIENT
Start: 2025-02-10

## 2025-03-19 ENCOUNTER — APPOINTMENT (OUTPATIENT)
Dept: ENDOCRINOLOGY | Facility: CLINIC | Age: 70
End: 2025-03-19
Payer: MEDICARE

## 2025-07-29 ENCOUNTER — APPOINTMENT (OUTPATIENT)
Dept: OTOLARYNGOLOGY | Facility: CLINIC | Age: 70
End: 2025-07-29
Payer: MEDICARE

## 2025-07-29 ENCOUNTER — APPOINTMENT (OUTPATIENT)
Dept: AUDIOLOGY | Facility: CLINIC | Age: 70
End: 2025-07-29
Payer: MEDICARE

## 2025-08-05 ENCOUNTER — APPOINTMENT (OUTPATIENT)
Dept: AUDIOLOGY | Facility: CLINIC | Age: 70
End: 2025-08-05
Payer: MEDICARE

## 2025-08-05 ENCOUNTER — APPOINTMENT (OUTPATIENT)
Dept: OTOLARYNGOLOGY | Facility: CLINIC | Age: 70
End: 2025-08-05
Payer: MEDICARE

## 2025-08-05 VITALS — BODY MASS INDEX: 36.98 KG/M2 | WEIGHT: 244 LBS | HEIGHT: 68 IN

## 2025-08-05 DIAGNOSIS — H90.3 ASNHL (ASYMMETRICAL SENSORINEURAL HEARING LOSS): Primary | ICD-10-CM

## 2025-08-05 DIAGNOSIS — H61.23 BILATERAL IMPACTED CERUMEN: ICD-10-CM

## 2025-08-05 DIAGNOSIS — H90.A22 SENSORINEURAL HEARING LOSS (SNHL) OF LEFT EAR WITH RESTRICTED HEARING OF RIGHT EAR: ICD-10-CM

## 2025-08-05 DIAGNOSIS — H93.292 IMPAIRMENT OF AUDITORY DISCRIMINATION OF LEFT EAR: ICD-10-CM

## 2025-08-05 DIAGNOSIS — H90.A22 SENSORINEURAL HEARING LOSS (SNHL) OF LEFT EAR WITH RESTRICTED HEARING OF RIGHT EAR: Primary | ICD-10-CM

## 2025-08-05 PROCEDURE — 92552 PURE TONE AUDIOMETRY AIR: CPT

## 2025-08-05 PROCEDURE — 1159F MED LIST DOCD IN RCRD: CPT | Performed by: OTOLARYNGOLOGY

## 2025-08-05 PROCEDURE — 3008F BODY MASS INDEX DOCD: CPT | Performed by: OTOLARYNGOLOGY

## 2025-08-05 PROCEDURE — 92556 SPEECH AUDIOMETRY COMPLETE: CPT

## 2025-08-05 PROCEDURE — 69210 REMOVE IMPACTED EAR WAX UNI: CPT | Performed by: OTOLARYNGOLOGY

## 2025-08-05 PROCEDURE — 1160F RVW MEDS BY RX/DR IN RCRD: CPT | Performed by: OTOLARYNGOLOGY

## 2025-08-05 PROCEDURE — 1036F TOBACCO NON-USER: CPT | Performed by: OTOLARYNGOLOGY

## 2025-08-05 PROCEDURE — 99212 OFFICE O/P EST SF 10 MIN: CPT | Performed by: OTOLARYNGOLOGY

## 2025-08-05 NOTE — PROGRESS NOTES
AUDIOLOGIC EVALUATION    Name:  Michael Tam  :  1955  Age:  70 y.o.    HISTORY:     Patient was seen for recheck of his asymmetric hearing loss prior to follow-up with Dr. Barcenas. He denied any changes in his hearing, but still expresses difficulty hearing with the left ear, especially in noisy environments.    EVALUATION:    See Audiogram.    RESULTS:    Otoscopy:  Right: Clear canal, normal color and appearance of tympanic membrane.  Left: Clear canal, normal color and appearance of tympanic membrane.    Tympanometry:  Did not test.    Hearing Sensitivity:  Right: Hearing within normal limits through 3000 Hz, precipitously sloping to severe sensorineural hearing loss.  Left: Moderately severe rising to mild at 2000 Hz, precipitously sloping to essentially moderate sensorineural hearing loss.    Word Recognition Score (NU-6 ordered -3/3A):  Right: Excellent (90%) at 55 dBHL.  Left: Very poor (32%) at 80 dBHL (PB-Max).  Binaural: Excellent (90%) at 70 dBHL.    IMPRESSIONS:    Compared to previous hearing evaluation on 2024, hearing remains stable, bilaterally. Slight decrease in WRS of the left ear (40% to 32%) from 2023.    ASSESSMENT AND PLAN:    - Continue medical follow-up with Dr. Barcenas.  - Continue use of amplification and follow-up as recommended for hearing aid maintenance and adjustments.  - Consider reprogramming of left hearing aid to reduce amplification to prevent distortion. Also counseled patient regarding CROS hearing aid considerations.  - Monitor and recheck hearing as warranted.  - Counseled regarding results and recommendations.      Annika Roth  Clinical Audiologist

## 2025-08-05 NOTE — PROGRESS NOTES
Subjective   Patient ID: Michael Tam is a 70 y.o. male  HPI  Patient presents for follow-up for chronic bilateral sensorineural hearing loss with asymmetry of the left side.  He continues to use the hearing aids and he has not noticed any change in the hearing recently.  Review of Systems    Objective   Physical Exam  There is cerumen impaction bilaterally and this was cleared using speculum and curettes.  The tympanic membranes are clear and mobile.  A repeat hearing test was obtained today and reveals bilateral sensorineural hearing loss with asymmetry on the left side.  The last hearing test from July 2024 was reviewed and there is some deterioration of the word recognition score on the left side from 40% to 32%.    Ear cerumen removal    Date/Time: 8/5/2025 10:41 AM    Performed by: Rob Barcenas MD  Authorized by: Rob Barcenas MD    Consent:     Consent obtained:  Verbal    Risks discussed:  Pain  Procedure details:     Location:  L ear and R ear    Procedure type: curette        Assessment/Plan   Diagnoses and all orders for this visit:  ASNHL (asymmetrical sensorineural hearing loss) (Primary)  Sensorineural hearing loss (SNHL) of left ear with restricted hearing of right ear  Bilateral impacted cerumen  -     Ear cerumen removal  Impairment of auditory discrimination of left ear     1. Bilateral sensorineural hearing loss with asymmetry on the left side with no evidence of intracranial or retrocochlear lesion and some deterioration of the auditory discrimination noted on the left side over the last year.  He was advised to continue using the hearing aid and he will follow-up in 1 year with a repeat hearing test.  2.  Bilateral cerumen impaction cleared today.  He will follow-up in 1 year.

## 2025-08-14 ENCOUNTER — APPOINTMENT (OUTPATIENT)
Dept: AUDIOLOGY | Facility: CLINIC | Age: 70
End: 2025-08-14
Payer: MEDICARE

## 2025-08-14 DIAGNOSIS — H90.A22 SENSORINEURAL HEARING LOSS (SNHL) OF LEFT EAR WITH RESTRICTED HEARING OF RIGHT EAR: Primary | ICD-10-CM

## 2025-08-18 ENCOUNTER — APPOINTMENT (OUTPATIENT)
Dept: ALLERGY | Facility: CLINIC | Age: 70
End: 2025-08-18
Payer: MEDICARE

## 2025-08-18 VITALS
DIASTOLIC BLOOD PRESSURE: 77 MMHG | BODY MASS INDEX: 34.52 KG/M2 | WEIGHT: 227 LBS | SYSTOLIC BLOOD PRESSURE: 145 MMHG | HEART RATE: 74 BPM

## 2025-08-18 DIAGNOSIS — J45.40 MODERATE PERSISTENT ASTHMA WITHOUT COMPLICATION (HHS-HCC): ICD-10-CM

## 2025-08-18 DIAGNOSIS — J31.0 CHRONIC RHINITIS: Primary | ICD-10-CM

## 2025-08-18 DIAGNOSIS — G47.33 OSA (OBSTRUCTIVE SLEEP APNEA): ICD-10-CM

## 2025-08-18 PROCEDURE — 99214 OFFICE O/P EST MOD 30 MIN: CPT | Performed by: ALLERGY & IMMUNOLOGY

## 2025-08-18 PROCEDURE — 3077F SYST BP >= 140 MM HG: CPT | Performed by: ALLERGY & IMMUNOLOGY

## 2025-08-18 PROCEDURE — 3078F DIAST BP <80 MM HG: CPT | Performed by: ALLERGY & IMMUNOLOGY

## 2025-08-18 PROCEDURE — G2211 COMPLEX E/M VISIT ADD ON: HCPCS | Performed by: ALLERGY & IMMUNOLOGY

## 2025-08-18 RX ORDER — FLUTICASONE PROPIONATE AND SALMETEROL 500; 50 UG/1; UG/1
1 POWDER RESPIRATORY (INHALATION) 2 TIMES DAILY
Qty: 180 EACH | Refills: 3 | Status: SHIPPED | OUTPATIENT
Start: 2025-08-18

## 2025-08-18 RX ORDER — ALBUTEROL SULFATE 90 UG/1
1-2 INHALANT RESPIRATORY (INHALATION) EVERY 4 HOURS PRN
Qty: 18 G | Refills: 5 | Status: SHIPPED | OUTPATIENT
Start: 2025-08-18

## 2025-11-19 ENCOUNTER — APPOINTMENT (OUTPATIENT)
Dept: ENDOCRINOLOGY | Facility: CLINIC | Age: 70
End: 2025-11-19
Payer: MEDICARE

## 2025-12-03 ENCOUNTER — APPOINTMENT (OUTPATIENT)
Dept: UROLOGY | Facility: CLINIC | Age: 70
End: 2025-12-03
Payer: MEDICARE

## 2026-08-06 ENCOUNTER — APPOINTMENT (OUTPATIENT)
Dept: AUDIOLOGY | Facility: CLINIC | Age: 71
End: 2026-08-06
Payer: MEDICARE

## 2026-08-06 ENCOUNTER — APPOINTMENT (OUTPATIENT)
Dept: OTOLARYNGOLOGY | Facility: CLINIC | Age: 71
End: 2026-08-06
Payer: MEDICARE

## 2026-08-17 ENCOUNTER — APPOINTMENT (OUTPATIENT)
Dept: ALLERGY | Facility: CLINIC | Age: 71
End: 2026-08-17
Payer: MEDICARE